# Patient Record
Sex: FEMALE | Race: WHITE | NOT HISPANIC OR LATINO | URBAN - METROPOLITAN AREA
[De-identification: names, ages, dates, MRNs, and addresses within clinical notes are randomized per-mention and may not be internally consistent; named-entity substitution may affect disease eponyms.]

---

## 2017-09-08 ENCOUNTER — EMERGENCY (EMERGENCY)
Facility: HOSPITAL | Age: 47
LOS: 0 days | Discharge: HOME | End: 2017-09-08

## 2017-09-08 DIAGNOSIS — J20.9 ACUTE BRONCHITIS, UNSPECIFIED: ICD-10-CM

## 2017-09-08 DIAGNOSIS — R05 COUGH: ICD-10-CM

## 2019-09-14 ENCOUNTER — EMERGENCY (EMERGENCY)
Facility: HOSPITAL | Age: 49
LOS: 0 days | Discharge: HOME | End: 2019-09-14
Admitting: EMERGENCY MEDICINE
Payer: COMMERCIAL

## 2019-09-14 VITALS
SYSTOLIC BLOOD PRESSURE: 133 MMHG | WEIGHT: 192.02 LBS | RESPIRATION RATE: 18 BRPM | OXYGEN SATURATION: 100 % | HEART RATE: 76 BPM | TEMPERATURE: 97 F | DIASTOLIC BLOOD PRESSURE: 86 MMHG
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
## 2019-09-14 DIAGNOSIS — M54.5 LOW BACK PAIN: ICD-10-CM
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
## 2019-09-14 DIAGNOSIS — M54.9 DORSALGIA, UNSPECIFIED: ICD-10-CM
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
## 2019-09-14 PROCEDURE — 99283 EMERGENCY DEPT VISIT LOW MDM: CPT

## 2019-09-14 RX ORDER — METHOCARBAMOL 500 MG/1
2 TABLET, FILM COATED ORAL
Qty: 30 | Refills: 0
Start: 2019-09-14 | End: 2019-09-18

## 2019-09-14 NOTE — ED PROVIDER NOTE - CARE PROVIDER_API CALL
NEUROSCIENCE WALK IN [place],   MON-FRI 8AM TO 6PM  [address] [place] [address]  Phone: [phone]  Fax: (   )    -  Follow Up Time:     Dustin Edwards)  Neurological Surgery  [address] [place] [address]  Phone: [phone]  Fax: [phone]  Follow Up Time:     YOUR PRIMARY CARE PHYSICIAN,   Phone: (   )    -  Fax: (   )    -  Follow Up Time:

## 2019-09-14 NOTE — ED ADULT NURSE NOTE - OBJECTIVE STATEMENT
pt presents with left lower back pain that started 2 days ago after leaning down to move a rug. denies pain radiating down left leg, abdominal pain. prior treatment: lidotral 3.88%, cyclobenzaprine 15 mg, naproxen 375 mg, and  pennsaid 2% with no symptom relief.

## 2019-09-14 NOTE — ED PROVIDER NOTE - MUSCULOSKELETAL, MLM
+ left lumbar paravertebral tenderness without spinous process tenderness ; Spine appears normal, range of motion is not limited, no joint tenderness

## 2019-09-14 NOTE — ED PROVIDER NOTE - PROVIDER TOKENS
FREE:[LAST:[NEUROSCIENCE WALK IN [place]],PHONE:[phone]],FAX:[(   )    -],ADDRESS:[MON-FRI 8AM TO 6PM  [address] [place] [address]]],PROVIDER:[TOKEN:[[id number]]],FREE:[LAST:[YOUR PRIMARY CARE PHYSICIAN],PHONE:[(   )    -],FAX:[(   )    -]]

## 2019-09-14 NOTE — ED PROVIDER NOTE - OBJECTIVE STATEMENT
49 y/o F, no significant PMHx, presents to the ED with complaints of left lower back pain x two days. Patient states that she was bending forward to move a heavy rug when she felt pain along her left lower back that is exacerbated upon movement. She denies chest pain, dyspnea, radiating pain, numbness/tingling, bowel/bladder incontinence, skin color changes/rash and abdominal pain. She states that her friend is a pharmacist and supplied her with Diclofenac lotion, lidocaine patches, Naprosyn 375 PO TID and Flexeril 15mg.

## 2019-09-17 PROBLEM — Z00.00 ENCOUNTER FOR PREVENTIVE HEALTH EXAMINATION: Status: ACTIVE | Noted: 2019-09-17

## 2019-10-15 ENCOUNTER — APPOINTMENT (OUTPATIENT)
Dept: PLASTIC SURGERY | Facility: CLINIC | Age: 49
End: 2019-10-15

## 2021-11-30 PROBLEM — Z78.9 OTHER SPECIFIED HEALTH STATUS: Chronic | Status: ACTIVE | Noted: 2019-09-14

## 2021-12-16 ENCOUNTER — APPOINTMENT (OUTPATIENT)
Dept: OTOLARYNGOLOGY | Facility: CLINIC | Age: 51
End: 2021-12-16
Payer: COMMERCIAL

## 2021-12-16 VITALS — BODY MASS INDEX: 28.79 KG/M2 | WEIGHT: 190 LBS | HEIGHT: 68 IN

## 2021-12-16 DIAGNOSIS — R06.83 SNORING: ICD-10-CM

## 2021-12-16 DIAGNOSIS — J34.89 OTHER SPECIFIED DISORDERS OF NOSE AND NASAL SINUSES: ICD-10-CM

## 2021-12-16 DIAGNOSIS — J32.9 CHRONIC SINUSITIS, UNSPECIFIED: ICD-10-CM

## 2021-12-16 DIAGNOSIS — G47.9 SLEEP DISORDER, UNSPECIFIED: ICD-10-CM

## 2021-12-16 PROCEDURE — 99204 OFFICE O/P NEW MOD 45 MIN: CPT | Mod: 25

## 2021-12-16 PROCEDURE — 31231 NASAL ENDOSCOPY DX: CPT

## 2021-12-16 RX ORDER — AMOXICILLIN AND CLAVULANATE POTASSIUM 875; 125 MG/1; MG/1
875-125 TABLET, COATED ORAL
Qty: 20 | Refills: 0 | Status: ACTIVE | COMMUNITY
Start: 2021-12-16 | End: 1900-01-01

## 2021-12-16 RX ORDER — FEXOFENADINE HCL AND PSEUDOEPHEDRINE HCI 60; 120 MG/1; MG/1
60-120 TABLET, EXTENDED RELEASE ORAL
Qty: 40 | Refills: 2 | Status: ACTIVE | COMMUNITY
Start: 2021-12-16 | End: 1900-01-01

## 2021-12-16 RX ORDER — METHYLPREDNISOLONE 4 MG/1
4 TABLET ORAL
Qty: 1 | Refills: 0 | Status: ACTIVE | COMMUNITY
Start: 2021-12-16 | End: 1900-01-01

## 2021-12-16 NOTE — HISTORY OF PRESENT ILLNESS
[de-identified] : Patient presents  today c/o Sinus headaches and snoring  . Frontal  headaches  around headaches and eyes .  Seasonal allergies ,   was taking  singular and loratadine.  Claritin  as needed. pt has nasal congestion and has been on allergy meds and nasal sprays  with minimal improvement.   \par No dizziness or  pressure in ears \par c/o snoring. present for several months. wakes up gasping for air, restless sleep, morning headaches, tiredness in daytime, intermittent sleep, gained weight recently feels problem has worsened in the past month [Snoring] : snoring [Witnessed Apneas] : witnessed sleep apnea [Frequent Nocturnal Awakening] : frequent nocturnal awakening [Daytime Somnolence] : daytime somnolence [Awakes Unrefreshed] : awakening unrefreshed [Awakes with Headache] : headache upon awakening [Awakening With Dry Mouth] : awakening with dry mouth

## 2021-12-16 NOTE — PROCEDURE
[Flexible Endoscope] : examined with the flexible endoscope [Congested] : congested [Shahnaz] : shahnaz [Nasal Mucosa] : purulence on the right [Normal] : the paranasal sinuses had no abnormalities

## 2021-12-16 NOTE — PHYSICAL EXAM
[Nasal Endoscopy Performed] : nasal endoscopy was performed, see procedure section for findings [Midline] : trachea located in midline position [Normal] : no rashes [de-identified] : DU

## 2022-01-20 ENCOUNTER — APPOINTMENT (OUTPATIENT)
Dept: OTOLARYNGOLOGY | Facility: CLINIC | Age: 52
End: 2022-01-20

## 2022-02-28 ENCOUNTER — INPATIENT (INPATIENT)
Facility: HOSPITAL | Age: 52
LOS: 0 days | Discharge: HOME | End: 2022-03-01
Attending: INTERNAL MEDICINE | Admitting: INTERNAL MEDICINE
Payer: COMMERCIAL

## 2022-02-28 VITALS
HEART RATE: 91 BPM | OXYGEN SATURATION: 98 % | DIASTOLIC BLOOD PRESSURE: 69 MMHG | SYSTOLIC BLOOD PRESSURE: 130 MMHG | RESPIRATION RATE: 20 BRPM | TEMPERATURE: 97 F

## 2022-02-28 LAB
ALBUMIN SERPL ELPH-MCNC: 4.3 G/DL — SIGNIFICANT CHANGE UP (ref 3.5–5.2)
ALP SERPL-CCNC: 65 U/L — SIGNIFICANT CHANGE UP (ref 30–115)
ALT FLD-CCNC: 80 U/L — HIGH (ref 0–41)
ANION GAP SERPL CALC-SCNC: 11 MMOL/L — SIGNIFICANT CHANGE UP (ref 7–14)
APTT BLD: 33.1 SEC — SIGNIFICANT CHANGE UP (ref 27–39.2)
AST SERPL-CCNC: 42 U/L — HIGH (ref 0–41)
BASOPHILS # BLD AUTO: 0.05 K/UL — SIGNIFICANT CHANGE UP (ref 0–0.2)
BASOPHILS NFR BLD AUTO: 0.5 % — SIGNIFICANT CHANGE UP (ref 0–1)
BILIRUB SERPL-MCNC: 0.3 MG/DL — SIGNIFICANT CHANGE UP (ref 0.2–1.2)
BUN SERPL-MCNC: 12 MG/DL — SIGNIFICANT CHANGE UP (ref 10–20)
CALCIUM SERPL-MCNC: 9.8 MG/DL — SIGNIFICANT CHANGE UP (ref 8.5–10.1)
CHLORIDE SERPL-SCNC: 107 MMOL/L — SIGNIFICANT CHANGE UP (ref 98–110)
CO2 SERPL-SCNC: 22 MMOL/L — SIGNIFICANT CHANGE UP (ref 17–32)
CREAT SERPL-MCNC: 0.6 MG/DL — LOW (ref 0.7–1.5)
EGFR: 107 ML/MIN/1.73M2 — SIGNIFICANT CHANGE UP
EOSINOPHIL # BLD AUTO: 0.2 K/UL — SIGNIFICANT CHANGE UP (ref 0–0.7)
EOSINOPHIL NFR BLD AUTO: 2.1 % — SIGNIFICANT CHANGE UP (ref 0–8)
GLUCOSE SERPL-MCNC: 87 MG/DL — SIGNIFICANT CHANGE UP (ref 70–99)
HCG SERPL QL: NEGATIVE — SIGNIFICANT CHANGE UP
HCT VFR BLD CALC: 39.7 % — SIGNIFICANT CHANGE UP (ref 37–47)
HGB BLD-MCNC: 13 G/DL — SIGNIFICANT CHANGE UP (ref 12–16)
IMM GRANULOCYTES NFR BLD AUTO: 0.2 % — SIGNIFICANT CHANGE UP (ref 0.1–0.3)
INR BLD: 1.05 RATIO — SIGNIFICANT CHANGE UP (ref 0.65–1.3)
LACTATE SERPL-SCNC: 0.8 MMOL/L — SIGNIFICANT CHANGE UP (ref 0.7–2)
LIDOCAIN IGE QN: 32 U/L — SIGNIFICANT CHANGE UP (ref 7–60)
LYMPHOCYTES # BLD AUTO: 3.68 K/UL — HIGH (ref 1.2–3.4)
LYMPHOCYTES # BLD AUTO: 38.1 % — SIGNIFICANT CHANGE UP (ref 20.5–51.1)
MCHC RBC-ENTMCNC: 28.8 PG — SIGNIFICANT CHANGE UP (ref 27–31)
MCHC RBC-ENTMCNC: 32.7 G/DL — SIGNIFICANT CHANGE UP (ref 32–37)
MCV RBC AUTO: 88 FL — SIGNIFICANT CHANGE UP (ref 81–99)
MONOCYTES # BLD AUTO: 0.62 K/UL — HIGH (ref 0.1–0.6)
MONOCYTES NFR BLD AUTO: 6.4 % — SIGNIFICANT CHANGE UP (ref 1.7–9.3)
NEUTROPHILS # BLD AUTO: 5.08 K/UL — SIGNIFICANT CHANGE UP (ref 1.4–6.5)
NEUTROPHILS NFR BLD AUTO: 52.7 % — SIGNIFICANT CHANGE UP (ref 42.2–75.2)
NRBC # BLD: 0 /100 WBCS — SIGNIFICANT CHANGE UP (ref 0–0)
PLATELET # BLD AUTO: 288 K/UL — SIGNIFICANT CHANGE UP (ref 130–400)
POTASSIUM SERPL-MCNC: 4.1 MMOL/L — SIGNIFICANT CHANGE UP (ref 3.5–5)
POTASSIUM SERPL-SCNC: 4.1 MMOL/L — SIGNIFICANT CHANGE UP (ref 3.5–5)
PROT SERPL-MCNC: 7.5 G/DL — SIGNIFICANT CHANGE UP (ref 6–8)
PROTHROM AB SERPL-ACNC: 12.1 SEC — SIGNIFICANT CHANGE UP (ref 9.95–12.87)
RBC # BLD: 4.51 M/UL — SIGNIFICANT CHANGE UP (ref 4.2–5.4)
RBC # FLD: 12.9 % — SIGNIFICANT CHANGE UP (ref 11.5–14.5)
SARS-COV-2 RNA SPEC QL NAA+PROBE: SIGNIFICANT CHANGE UP
SODIUM SERPL-SCNC: 140 MMOL/L — SIGNIFICANT CHANGE UP (ref 135–146)
WBC # BLD: 9.65 K/UL — SIGNIFICANT CHANGE UP (ref 4.8–10.8)
WBC # FLD AUTO: 9.65 K/UL — SIGNIFICANT CHANGE UP (ref 4.8–10.8)

## 2022-02-28 PROCEDURE — 74177 CT ABD & PELVIS W/CONTRAST: CPT | Mod: 26,MA

## 2022-02-28 PROCEDURE — 99285 EMERGENCY DEPT VISIT HI MDM: CPT

## 2022-02-28 PROCEDURE — 74019 RADEX ABDOMEN 2 VIEWS: CPT | Mod: 26

## 2022-02-28 PROCEDURE — 99223 1ST HOSP IP/OBS HIGH 75: CPT

## 2022-02-28 PROCEDURE — 99283 EMERGENCY DEPT VISIT LOW MDM: CPT

## 2022-02-28 RX ORDER — ONDANSETRON 8 MG/1
4 TABLET, FILM COATED ORAL EVERY 8 HOURS
Refills: 0 | Status: DISCONTINUED | OUTPATIENT
Start: 2022-02-28 | End: 2022-03-01

## 2022-02-28 RX ORDER — SOD SULF/SODIUM/NAHCO3/KCL/PEG
4000 SOLUTION, RECONSTITUTED, ORAL ORAL ONCE
Refills: 0 | Status: COMPLETED | OUTPATIENT
Start: 2022-02-28 | End: 2022-02-28

## 2022-02-28 RX ORDER — LANOLIN ALCOHOL/MO/W.PET/CERES
3 CREAM (GRAM) TOPICAL AT BEDTIME
Refills: 0 | Status: DISCONTINUED | OUTPATIENT
Start: 2022-02-28 | End: 2022-03-01

## 2022-02-28 RX ORDER — ACETAMINOPHEN 500 MG
650 TABLET ORAL EVERY 6 HOURS
Refills: 0 | Status: DISCONTINUED | OUTPATIENT
Start: 2022-02-28 | End: 2022-03-01

## 2022-02-28 RX ORDER — ENOXAPARIN SODIUM 100 MG/ML
40 INJECTION SUBCUTANEOUS AT BEDTIME
Refills: 0 | Status: DISCONTINUED | OUTPATIENT
Start: 2022-02-28 | End: 2022-03-01

## 2022-02-28 RX ADMIN — Medication 4000 MILLILITER(S): at 22:36

## 2022-02-28 RX ADMIN — Medication 20 MILLIGRAM(S): at 22:36

## 2022-02-28 NOTE — ED PROVIDER NOTE - OBJECTIVE STATEMENT
50 yo female with no pertinent c/o FB ingestion. pt states to have swallowed her dental bridge around 5pm yesterday. pt states to have had nausea yesterday that has subsided. pt denies any other symptoms including fevers, chill, headache, recent illness/travel, cough, abdominal pain, chest pain, or SOB.

## 2022-02-28 NOTE — H&P ADULT - NSHPLABSRESULTS_GEN_ALL_CORE
13.0   9.65  )-----------( 288      ( 28 Feb 2022 15:30 )             39.7       02-28    140  |  107  |  12  ----------------------------<  87  4.1   |  22  |  0.6<L>    Ca    9.8      28 Feb 2022 15:30    TPro  7.5  /  Alb  4.3  /  TBili  0.3  /  DBili  x   /  AST  42<H>  /  ALT  80<H>  /  AlkPhos  65  02-28                  PT/INR - ( 28 Feb 2022 15:30 )   PT: 12.10 sec;   INR: 1.05 ratio         PTT - ( 28 Feb 2022 15:30 )  PTT:33.1 sec    Lactate Trend  02-28 @ 15:30 Lactate:0.8             CAPILLARY BLOOD GLUCOSE

## 2022-02-28 NOTE — CONSULT NOTE ADULT - SUBJECTIVE AND OBJECTIVE BOX
GENERAL SURGERY CONSULT NOTE    Patient: MONTSE WILLSON , 51y ([birth year])Female   MRN: [id number]  Location: [facility] ED  Visit: 02-28-22 Emergency  Date: 02-28-22 @ 19:16    HPI: 52 y/o w/ no PMH presents after ingestion of dentures. Patient was at dinner yesterday and accidently swallowed her dentures. No fever, nausea, vomiting. Passing gas and bowel movements. Asymptomatic       PAST MEDICAL & SURGICAL HISTORY:  No pertinent past medical history        Home Medications:        VITALS:  T(F): 97.4 (02-28-22 @ 12:48), Max: 97.4 (02-28-22 @ 12:48)  HR: 91 (02-28-22 @ 12:48) (91 - 91)  BP: 130/69 (02-28-22 @ 12:48) (130/69 - 130/69)  RR: 20 (02-28-22 @ 12:48) (20 - 20)  SpO2: 98% (02-28-22 @ 12:48) (98% - 98%)    PHYSICAL EXAM:  General: NAD, AAOx3, calm and cooperative  HEENT: NCAT, CLAUDIA, EOMI, Trachea ML, Neck supple  Cardiac: RRR S1, S2, no Murmurs, rubs or gallops  Respiratory: CTAB, normal respiratory effort, breath sounds equal BL  Abdomen: Soft, non-distended, non-tender, no rebound, no guarding. +BS.  Musculoskeletal: Strength equal BL UE/LE, ROM intact  Skin: Warm/dry, normal color, no jaundice    MEDICATIONS  (STANDING):    MEDICATIONS  (PRN):      LAB/STUDIES:                        13.0   9.65  )-----------( 288      ( 28 Feb 2022 15:30 )             39.7     02-28    140  |  107  |  12  ----------------------------<  87  4.1   |  22  |  0.6<L>    Ca    9.8      28 Feb 2022 15:30    TPro  7.5  /  Alb  4.3  /  TBili  0.3  /  DBili  x   /  AST  42<H>  /  ALT  80<H>  /  AlkPhos  65  02-28    PT/INR - ( 28 Feb 2022 15:30 )   PT: 12.10 sec;   INR: 1.05 ratio         PTT - ( 28 Feb 2022 15:30 )  PTT:33.1 sec  LIVER FUNCTIONS - ( 28 Feb 2022 15:30 )  Alb: 4.3 g/dL / Pro: 7.5 g/dL / ALK PHOS: 65 U/L / ALT: 80 U/L / AST: 42 U/L / GGT: x                 IMAGING:  < from: CT Abdomen and Pelvis w/ IV Cont (02.28.22 @ 17:01) >  IMPRESSION:    Radiopaque 3.1 cm foreign body in the cecum, consistent with dental   implant    Hepatic steatosis.    < end of copied text >  < from: Xray Abdomen 2 Views (02.28.22 @ 14:56) >  impression:    There is a right lower quadrant foreign body measuring approximately 3.7   cm. This may be within the cecum, but exact location is difficult to   evaluate on this examination.    An IUD is present.    The bowel gas pattern is not obstructed.    The osseous structures are unremarkable for age.    < end of copied text >

## 2022-02-28 NOTE — ED PROVIDER NOTE - ATTENDING CONTRIBUTION TO CARE
52 yo F pw swallowed foreign body. Pt states last night while eating she swallowed her dental bridge. Associated with mild nausea. No abd pain, no vomiting, no diarrhea, no cp, no sob, no f/c.     CONSTITUTIONAL: Well-developed; well-nourished; in no acute distress. Sitting up and providing appropriate history and physical examination  SKIN: skin exam is warm and dry, no acute rash.  HEAD: Normocephalic; atraumatic.  EYES: PERRL, 3 mm bilateral, no nystagmus, EOM intact; conjunctiva and sclera clear.  ENT: No nasal discharge; airway clear.  NECK: Supple; non tender. + full passive ROM in all directions. No JVD  CARD: S1, S2 normal; no murmurs, gallops, or rubs. Regular rate and rhythm. + Symmetric Strong Pulses  RESP: No wheezes, rales or rhonchi. Good air movement bilaterally  ABD: soft; non-distended; non-tender. No Rebound, No Guarding, No signs of peritonitis, No CVA tenderness. No pulsatile abdominal mass. + Strong and Symmetric Pulses  EXT: Normal ROM. No clubbing, cyanosis or edema. Dp and Pt Pulses intact. Cap refill less than 3 seconds  NEURO: CN 2-12 intact, normal finger to nose, normal romberg, stable gait, no sensory or motor deficits, Alert, oriented, grossly unremarkable. No Focal deficits. GCS 15. NIH 0  PSYCH: Cooperative, appropriate.

## 2022-02-28 NOTE — CONSULT NOTE ADULT - ATTENDING COMMENTS
Ingestion of dentures, Xray does not show any movement of dentures. To attempt Colonoscopy and removal.
Patient is a 51F with no PMH who presents after accidentally swallowing her dentures.    Patient seen and examined.  No acute events.  Patient denies any active symptoms    Abdomen - soft, non tender, non distended    Vitals labs and images reviewed    Plan  - no acute surgical intervention  - allow time for patient to pass the denture  - daily KUB  - f/u GI for possible colonoscopy and retrieval  - surgery to follow

## 2022-02-28 NOTE — H&P ADULT - ASSESSMENT
52 yo female with no significant PMH presents to the ED c/o accidental ingestion of her dental bridge around 5pm yesterday. Pt endorsed nausea yesterday that has subsided. Denies fevers, chill, headache, recent illness/travel, cough, abdominal pain, chest pain, or SOB.    In the ED, pt hemodynamically stable. Labs wnl except for AST 42, ALT 80      # Foreign body in colon (Dental Bridge)  - swallowed 2/27 around 5pm  - KUB suggestive of foreign body in cecum  - pt denies abdominal pain  - CT abdomen/pelvis with IV contrast - Radiopaque 3.1 cm foreign body in the cecum, consistent with dental   implant. Hepatic steatosis.  - f/u rpt KUB AM  - Surgery eval - No acute surgical intervention   - Clear liquid diet  - golytely 4L start now  - dulcolax 20 mg at 10 pm  - NPO after midnight  -possible colonoscopy in AM if objects dose not pass    #Mild transaminitis   Hepatic steatosis on CT abdomen  Trend LFTs qD     #DVT PPX : Lovenox  #Diet: NPO after MN  #GI PPX: Not indicated  #Activity: IAT  FULL CODE

## 2022-02-28 NOTE — ED PROVIDER NOTE - CLINICAL SUMMARY MEDICAL DECISION MAKING FREE TEXT BOX
51-year-old female here for swallowing of foreign body.  In the emergency department had screening exam, imaging, consultation with gastroenterology, with plan for observation pending forward motion potential transit.  No indication for acute procedural intervention at this time.  Imaging consistent with clinical impression.  Will admit for continued management and observation of progression of ingested sizable foreign body.

## 2022-02-28 NOTE — CONSULT NOTE ADULT - ASSESSMENT
ASSESSMENT:  51yF w/ no PMH presents after swallowing her denture. Asymptomatic. Imaging shows object passed into cecum    PLAN:  - Admit to Medicine  - No need for surgical intervention at this time  - Patient is passing gas and having bowel movements, possible to pass object on her own  - GI on board for C-scope if needed    Above plan discussed with Attending Surgeon Dr. Kessler  02-28-22 @ 19:16  
52 y/o F with h/o sinusitis came to ER because she swallowed her dental bridge yesterday around 5 pm while she was eating salad. She denies any abdominal pain, melena or hematochezia.        # Foreign body in colon:  - swallowed dental bridge yesterday 5pm  - KUB suggestive of foreign body in cecum  - No abdominal pain  - Abdomen soft    Rec:  - Please send CBC, BMP, PT/INR and active type and screen  - COVID PCR  - CT abdomen/pelvis with IV contrast  - KUB AM  - Surgery eval  - Clear liquid diet  - golytely 4L start now  - dulcolax 20 mg at 10 pm  - NPO after midnight  - will pan for colonoscopy tomorrow if objects dose not pass

## 2022-02-28 NOTE — ED PROVIDER NOTE - NSTIMEPROVIDERCAREINITIATE_GEN_ER
Follow up in 2 weeks  Utilize counseling at school as needed  Suicide prevention resources as needed 
28-Feb-2022 13:06

## 2022-02-28 NOTE — ED PROVIDER NOTE - ADMIT DISPOSITION PRESENT ON ADMISSION SEPSIS
Quality 226: Preventive Care And Screening: Tobacco Use: Screening And Cessation Intervention: Patient screened for tobacco use and is an ex/non-smoker Quality 431: Preventive Care And Screening: Unhealthy Alcohol Use - Screening: Patient screened for unhealthy alcohol use using a single question and scores less than 2 times per year Quality 110: Preventive Care And Screening: Influenza Immunization: Influenza Immunization previously received during influenza season Quality 265: Biopsy Follow-Up: Biopsy results reviewed, communicated, tracked, and documented Detail Level: Generalized Quality 130: Documentation Of Current Medications In The Medical Record: Current Medications Documented No

## 2022-02-28 NOTE — H&P ADULT - HISTORY OF PRESENT ILLNESS
52 yo female with no significant PMH presents to the ED c/o accidental ingestion of her dental bridge around 5pm yesterday. Pt endorsed nausea yesterday that has subsided. Denies fevers, chill, headache, recent illness/travel, cough, abdominal pain, chest pain, or SOB.    In the ED, pt hemodynamically stable. Labs wnl except for AST 42, ALT 80    CT abdomen - CT abdomen/pelvis with IV contrast - Radiopaque 3.1 cm foreign body in the cecum, consistent with dental implant. Hepatic steatosis.    Evaluated by GI and Surgical team- No acute surgical intervention. Possible colonoscopy in AM if pt does not pass object.

## 2022-02-28 NOTE — ED PROVIDER NOTE - PROGRESS NOTE DETAILS
GI consulted and recommend CT/labs/colorectal consult surgery consulted Pt signed out to Dr. Del Cid pending CT, reassessment, dispo.

## 2022-02-28 NOTE — CONSULT NOTE ADULT - SUBJECTIVE AND OBJECTIVE BOX
Gastroenterology Consultation:    Patient is a 51y old  Female who presents with a chief complaint of foreign body    Admitted on: 02-28-22  HPI:  52 y/o F with h/o sinusitis came to ER because she swallowed her dental bridge yesterday around 5 pm while she was eating salad. She denies any abdominal pain, melena or hematochezia.      Prior records Reviewed (Y/N): Y  History obtained from person other than patient (Y/N): N    Prior EGD: Never  Prior Colonoscopy: never      PAST MEDICAL & SURGICAL HISTORY:  No pertinent past medical history        FAMILY HISTORY:  Non-contributry    Social History:  Tobacco: N  Alcohol: N  Drugs: N    Home Medications:    MEDICATIONS  (STANDING):    MEDICATIONS  (PRN):      Allergies  No Known Allergies      Review of Systems:   Constitutional:  No Fever, No Chills  ENT/Mouth:  No Hearing Changes,  No Difficulty Swallowing  Eyes:  No Eye Pain, No Vision Changes  Cardiovascular:  No Chest Pain, No Palpitations  Respiratory:  No Cough, No Dyspnea  Gastrointestinal:  As described in HPI  Musculoskeletal:  No Joint Swelling, No Back Pain  Skin:  No Skin Lesions, No Jaundice  Neuro:  No Syncope, No Dizziness  Heme/Lymph:  No Bruising, No Bleeding.          Physical Examination:  T(C): 36.3 (02-28-22 @ 12:48), Max: 36.3 (02-28-22 @ 12:48)  HR: 91 (02-28-22 @ 12:48) (91 - 91)  BP: 130/69 (02-28-22 @ 12:48) (130/69 - 130/69)  RR: 20 (02-28-22 @ 12:48) (20 - 20)  SpO2: 98% (02-28-22 @ 12:48) (98% - 98%)        Constitutional: No acute distress.  Eyes:. Conjunctivae are clear, Sclera is non-icteric.  Ears Nose and Throat: The external ears are normal appearing,  Oral mucosa is pink and moist.  Respiratory:  No signs of respiratory distress. Lung sounds are clear bilaterally.  Cardiovascular:  S1 S2, Regular rate and rhythm.  GI: Abdomen is soft, symmetric, and non-tender without distention. There are no visible lesions. Bowel sounds are present and normoactive in all four quadrants. No masses, hepatomegaly, or splenomegaly are noted.   Neuro: No Tremor, No involuntary movements  Skin: No rashes, No Jaundice.          Data: (reviewed by attending)    Hgb Trend:            Liver panel trend:

## 2022-03-01 ENCOUNTER — TRANSCRIPTION ENCOUNTER (OUTPATIENT)
Age: 52
End: 2022-03-01

## 2022-03-01 VITALS
HEART RATE: 66 BPM | RESPIRATION RATE: 18 BRPM | SYSTOLIC BLOOD PRESSURE: 144 MMHG | TEMPERATURE: 97 F | DIASTOLIC BLOOD PRESSURE: 87 MMHG | OXYGEN SATURATION: 98 %

## 2022-03-01 LAB
ABO RH CONFIRMATION: SIGNIFICANT CHANGE UP
ALBUMIN SERPL ELPH-MCNC: 4.6 G/DL — SIGNIFICANT CHANGE UP (ref 3.5–5.2)
ALP SERPL-CCNC: 68 U/L — SIGNIFICANT CHANGE UP (ref 30–115)
ALT FLD-CCNC: 93 U/L — HIGH (ref 0–41)
ANION GAP SERPL CALC-SCNC: 12 MMOL/L — SIGNIFICANT CHANGE UP (ref 7–14)
APTT BLD: 34.7 SEC — SIGNIFICANT CHANGE UP (ref 27–39.2)
AST SERPL-CCNC: 49 U/L — HIGH (ref 0–41)
BILIRUB SERPL-MCNC: 0.5 MG/DL — SIGNIFICANT CHANGE UP (ref 0.2–1.2)
BUN SERPL-MCNC: 10 MG/DL — SIGNIFICANT CHANGE UP (ref 10–20)
CALCIUM SERPL-MCNC: 10 MG/DL — SIGNIFICANT CHANGE UP (ref 8.5–10.1)
CHLORIDE SERPL-SCNC: 108 MMOL/L — SIGNIFICANT CHANGE UP (ref 98–110)
CO2 SERPL-SCNC: 26 MMOL/L — SIGNIFICANT CHANGE UP (ref 17–32)
CREAT SERPL-MCNC: 0.7 MG/DL — SIGNIFICANT CHANGE UP (ref 0.7–1.5)
EGFR: 103 ML/MIN/1.73M2 — SIGNIFICANT CHANGE UP
GLUCOSE SERPL-MCNC: 86 MG/DL — SIGNIFICANT CHANGE UP (ref 70–99)
HCT VFR BLD CALC: 42.7 % — SIGNIFICANT CHANGE UP (ref 37–47)
HGB BLD-MCNC: 13.7 G/DL — SIGNIFICANT CHANGE UP (ref 12–16)
INR BLD: 1.07 RATIO — SIGNIFICANT CHANGE UP (ref 0.65–1.3)
MCHC RBC-ENTMCNC: 28.7 PG — SIGNIFICANT CHANGE UP (ref 27–31)
MCHC RBC-ENTMCNC: 32.1 G/DL — SIGNIFICANT CHANGE UP (ref 32–37)
MCV RBC AUTO: 89.5 FL — SIGNIFICANT CHANGE UP (ref 81–99)
NRBC # BLD: 0 /100 WBCS — SIGNIFICANT CHANGE UP (ref 0–0)
PLATELET # BLD AUTO: 288 K/UL — SIGNIFICANT CHANGE UP (ref 130–400)
POTASSIUM SERPL-MCNC: 4.4 MMOL/L — SIGNIFICANT CHANGE UP (ref 3.5–5)
POTASSIUM SERPL-SCNC: 4.4 MMOL/L — SIGNIFICANT CHANGE UP (ref 3.5–5)
PROT SERPL-MCNC: 8.2 G/DL — HIGH (ref 6–8)
PROTHROM AB SERPL-ACNC: 12.3 SEC — SIGNIFICANT CHANGE UP (ref 9.95–12.87)
RBC # BLD: 4.77 M/UL — SIGNIFICANT CHANGE UP (ref 4.2–5.4)
RBC # FLD: 12.8 % — SIGNIFICANT CHANGE UP (ref 11.5–14.5)
SODIUM SERPL-SCNC: 146 MMOL/L — SIGNIFICANT CHANGE UP (ref 135–146)
WBC # BLD: 8.96 K/UL — SIGNIFICANT CHANGE UP (ref 4.8–10.8)
WBC # FLD AUTO: 8.96 K/UL — SIGNIFICANT CHANGE UP (ref 4.8–10.8)

## 2022-03-01 PROCEDURE — 45379 COLONOSCOPY W/FB REMOVAL: CPT

## 2022-03-01 PROCEDURE — 99232 SBSQ HOSP IP/OBS MODERATE 35: CPT

## 2022-03-01 PROCEDURE — 74018 RADEX ABDOMEN 1 VIEW: CPT | Mod: 26

## 2022-03-01 PROCEDURE — 99238 HOSP IP/OBS DSCHRG MGMT 30/<: CPT | Mod: 25

## 2022-03-01 RX ORDER — SODIUM CHLORIDE 9 MG/ML
1000 INJECTION, SOLUTION INTRAVENOUS
Refills: 0 | Status: DISCONTINUED | OUTPATIENT
Start: 2022-03-01 | End: 2022-03-01

## 2022-03-01 RX ORDER — MULTIVIT WITH MIN/MFOLATE/K2 340-15/3 G
1 POWDER (GRAM) ORAL ONCE
Refills: 0 | Status: COMPLETED | OUTPATIENT
Start: 2022-03-01 | End: 2022-03-01

## 2022-03-01 RX ADMIN — Medication 1 BOTTLE: at 07:39

## 2022-03-01 NOTE — DISCHARGE NOTE PROVIDER - NSDCFUADDAPPT_GEN_ALL_CORE_FT
Please follow up with your primary care doctor after discharge from the hospital. Please take your medications as prescribed.

## 2022-03-01 NOTE — PRE-ANESTHESIA EVALUATION ADULT - NSANTHOSAYNRD_GEN_A_CORE
No. JHONNY screening performed.  STOP BANG Legend: 0-2 = LOW Risk; 3-4 = INTERMEDIATE Risk; 5-8 = HIGH Risk

## 2022-03-01 NOTE — PROGRESS NOTE ADULT - SUBJECTIVE AND OBJECTIVE BOX
Colorectal SURGERY PROGRESS NOTE    Patient: MONTSE WILLSON , 51y ([birth year])Female   MRN: [id number]  Location: [facility] A  Visit: 02-28-22 Inpatient  Date: 03-01-22 @ 01:16    Hospital Day #: 2    Procedure/Dx/Injuries: Ingestion of foreign body    Events of past 24 hours: No acute events    PAST MEDICAL & SURGICAL HISTORY:  No pertinent past medical history        Vitals:   T(F): 97.4 (02-28-22 @ 12:48), Max: 97.4 (02-28-22 @ 12:48)  HR: 91 (02-28-22 @ 12:48)  BP: 130/69 (02-28-22 @ 12:48)  RR: 20 (02-28-22 @ 12:48)  SpO2: 98% (02-28-22 @ 12:48)      Diet, NPO after Midnight:      NPO Start Date: 28-Feb-2022,   NPO Start Time: 23:59  Diet, Clear Liquid    PHYSICAL EXAM:  General: NAD, AAOx3, calm and cooperative  HEENT: NCAT, CLAUDIA, EOMI, Trachea ML, Neck supple  Cardiac: RRR S1, S2, no Murmurs, rubs or gallops  Respiratory: CTAB, normal respiratory effort, breath sounds equal BL  Abdomen: Soft, non-distended, non-tender, no rebound, no guarding. +BS.  Musculoskeletal: Strength equal BL UE/LE, ROM intact  Skin: Warm/dry, normal color, no jaundice      MEDICATIONS  (STANDING):  enoxaparin Injectable 40 milliGRAM(s) SubCutaneous at bedtime    MEDICATIONS  (PRN):  acetaminophen     Tablet .. 650 milliGRAM(s) Oral every 6 hours PRN Temp greater or equal to 38C (100.4F), Mild Pain (1 - 3)  aluminum hydroxide/magnesium hydroxide/simethicone Suspension 30 milliLiter(s) Oral every 4 hours PRN Dyspepsia  melatonin 3 milliGRAM(s) Oral at bedtime PRN Insomnia  ondansetron Injectable 4 milliGRAM(s) IV Push every 8 hours PRN Nausea and/or Vomiting      DVT PROPHYLAXIS: enoxaparin Injectable 40 milliGRAM(s) SubCutaneous at bedtime    GI PROPHYLAXIS:   ANTICOAGULATION:   ANTIBIOTICS:      LAB/STUDIES:  Labs:  CAPILLARY BLOOD GLUCOSE                              13.0   9.65  )-----------( 288      ( 28 Feb 2022 15:30 )             39.7       Auto Neutrophil %: 52.7 % (02-28-22 @ 15:30)  Auto Immature Granulocyte %: 0.2 % (02-28-22 @ 15:30)    02-28    140  |  107  |  12  ----------------------------<  87  4.1   |  22  |  0.6<L>      Calcium, Total Serum: 9.8 mg/dL (02-28-22 @ 15:30)      LFTs:             7.5  | 0.3  | 42       ------------------[65      ( 28 Feb 2022 15:30 )  4.3  | x    | 80          Lipase:32     Amylase:x         Lactate, Blood: 0.8 mmol/L (02-28-22 @ 15:30)      Coags:     12.10  ----< 1.05    ( 28 Feb 2022 15:30 )     33.1        IMAGING:    < from: CT Abdomen and Pelvis w/ IV Cont (02.28.22 @ 17:01) >  IMPRESSION:    Radiopaque 3.1 cm foreign body in the cecum, consistent with dental   implant    Hepatic steatosis.

## 2022-03-01 NOTE — PROGRESS NOTE ADULT - ASSESSMENT
ASSESSMENT:  51yF w/ no PMH presents after swallowing her denture. Asymptomatic. Imaging shows object passed into cecum    PLAN:  - Continue care per primary team  - No need for surgical intervention at this time  - Patient is passing gas and having bowel movements, possible to pass object on her own  - GI on board for C-scope if needed  - Surgery will follow up    Blue team spectra [id number]  
ASSESSMENT:  51y F w/ no PMH presents w/ ingestion of foreign object. Did not pass in stool yet    PLAN:  - no acute surgical intervention  - allow time for patient to pass the denture  - daily KUB  - f/u GI for possible colonoscopy and retrieval  - surgery to follow .    Lines/Tubes: PIV, Midline, Central Line, A-Line, Chest tubes, Liam/ROSHAN drains, Claros Catheter.

## 2022-03-01 NOTE — DISCHARGE NOTE PROVIDER - NSDCCPCAREPLAN_GEN_ALL_CORE_FT
PRINCIPAL DISCHARGE DIAGNOSIS  Diagnosis: FB GI (foreign body in gastrointestinal tract)  Assessment and Plan of Treatment: You were admitted to the hospital following accidental ingestion of your dental bridge. CT scan showed radiopaque 3.1 cm foreign body in the cecum, consistent with dental implant. You were seen by our gastroenterology team and underwent colonoscopy. Your dental bridge was retrieved successfully. Please follow up with your primary care doctor after discharge from the hospital. Please take your medications as prescribed.

## 2022-03-01 NOTE — PROGRESS NOTE ADULT - SUBJECTIVE AND OBJECTIVE BOX
GENERAL SURGERY PROGRESS NOTE    Patient: MONTSE WILLSON , 51y ([birth year])Female   MRN: [id number]  Location: [facility] 017 A  Visit: 02-28-22 Inpatient  Date: 03-01-22 @ 12:57    Procedure/Dx/Injuries: ingestion of foreign body    Events of past 24 hours: No acute events. Did not pass ingested object    PAST MEDICAL & SURGICAL HISTORY:  No pertinent past medical history        Vitals:   T(F): 97.3 (03-01-22 @ 11:50), Max: 97.3 (03-01-22 @ 08:27)  HR: 84 (03-01-22 @ 12:21)  BP: 152/98 (03-01-22 @ 12:21)  RR: 18 (03-01-22 @ 12:21)  SpO2: 98% (03-01-22 @ 12:21)      Diet, NPO after Midnight:      NPO Start Date: 28-Feb-2022,   NPO Start Time: 23:59  Diet, Clear Liquid      Fluids:     I & O's:    Bowel Movement: : [x] YES [] NO  Flatus: : [x] YES [] NO    PHYSICAL EXAM:  General: NAD, AAOx3, calm and cooperative  HEENT: NCAT, CLAUDIA, EOMI, Trachea ML, Neck supple  Cardiac: RRR S1, S2, no Murmurs, rubs or gallops  Respiratory: CTAB, normal respiratory effort, breath sounds equal BL  Abdomen: Soft, non-distended, non-tender, no rebound, no guarding. +BS.  Musculoskeletal: Strength equal BL UE/LE, ROM intact  Skin: Warm/dry, normal color, no jaundice    MEDICATIONS  (STANDING):  enoxaparin Injectable 40 milliGRAM(s) SubCutaneous at bedtime    MEDICATIONS  (PRN):  acetaminophen     Tablet .. 650 milliGRAM(s) Oral every 6 hours PRN Temp greater or equal to 38C (100.4F), Mild Pain (1 - 3)  aluminum hydroxide/magnesium hydroxide/simethicone Suspension 30 milliLiter(s) Oral every 4 hours PRN Dyspepsia  melatonin 3 milliGRAM(s) Oral at bedtime PRN Insomnia  ondansetron Injectable 4 milliGRAM(s) IV Push every 8 hours PRN Nausea and/or Vomiting      DVT PROPHYLAXIS: enoxaparin Injectable 40 milliGRAM(s) SubCutaneous at bedtime    GI PROPHYLAXIS:   ANTICOAGULATION:   ANTIBIOTICS:            LAB/STUDIES:  Labs:  CAPILLARY BLOOD GLUCOSE                              13.7   8.96  )-----------( 288      ( 28 Feb 2022 23:30 )             42.7       Auto Neutrophil %: 52.7 % (02-28-22 @ 15:30)  Auto Immature Granulocyte %: 0.2 % (02-28-22 @ 15:30)    02-28    146  |  108  |  10  ----------------------------<  86  4.4   |  26  |  0.7      Calcium, Total Serum: 10.0 mg/dL (02-28-22 @ 23:30)      LFTs:             8.2  | 0.5  | 49       ------------------[68      ( 28 Feb 2022 23:30 )  4.6  | x    | 93          Lipase:x      Amylase:x         Lactate, Blood: 0.8 mmol/L (02-28-22 @ 15:30)      Coags:     12.30  ----< 1.07    ( 28 Feb 2022 23:30 )     34.7

## 2022-03-01 NOTE — ED ADULT NURSE REASSESSMENT NOTE - NS ED NURSE REASSESS COMMENT FT1
Assumed pt from previous nurse Segun and roomed in to 14 A c/o foreign body ingestion, reports accidentally swallowed dental bridge , AO x 4 ,no SOB  m no accessory muscles used m airway patent .

## 2022-03-01 NOTE — DISCHARGE NOTE PROVIDER - CARE PROVIDER_API CALL
Kevin Lake (DO)  Medicine  [address] [place] [address], [address] [place] [address]  Phone: [phone]  Fax: [phone]  Follow Up Time:

## 2022-03-01 NOTE — DISCHARGE NOTE PROVIDER - HOSPITAL COURSE
50 yo female with no significant PMH presents to the ED c/o accidental ingestion of her dental bridge around 5pm yesterday. Pt endorsed nausea yesterday that has subsided. Denies fevers, chill, headache, recent illness/travel, cough, abdominal pain, chest pain, or SOB.    In the ED, pt hemodynamically stable. Labs wnl except for AST 42, ALT 80    CT abdomen - CT abdomen/pelvis with IV contrast - Radiopaque 3.1 cm foreign body in the cecum, consistent with dental implant. Hepatic steatosis.    Evaluated by GI and Surgical team- No acute surgical intervention. Possible colonoscopy in AM if pt does not pass object.     GI scoped the pt and denture was retrieved. pt is stable for dc.     52 yo female with no significant PMH presents to the ED c/o accidental ingestion of her dental bridge around 5pm yesterday. Pt endorsed nausea yesterday that has subsided. Denies fevers, chill, headache, recent illness/travel, cough, abdominal pain, chest pain, or SOB.    In the ED, pt hemodynamically stable. Labs wnl except for AST 42, ALT 80    CT abdomen - CT abdomen/pelvis with IV contrast - Radiopaque 3.1 cm foreign body in the cecum, consistent with dental implant. Hepatic steatosis.    Evaluated by GI and Surgical team- No acute surgical intervention. Possible colonoscopy in AM if pt does not pass object.     GI scoped the pt and denture was retrieved. pt is stable for dc.      I have performed a history and physical exam of this patient and discussed the management with the resident. I have reviewed the resident note and agree with the documented findings and plan of care.

## 2022-03-01 NOTE — PROGRESS NOTE ADULT - ATTENDING COMMENTS
Patient is a 51F with no PMH who presents after accidentally swallowing her dentures.    Patient seen and examined.  No acute events.  Patient denies any active symptoms    Abdomen - soft, non tender, non distended    Vitals labs and images reviewed    Plan  - no acute surgical intervention  - allow time for patient to pass the denture  - daily KUB  - f/u GI for possible colonoscopy and retrieval  - surgery to follow Patient is a 51F with no PMH who presents after accidentally swallowing her dentures.    Patient seen and examined.  No acute events.  Patient denies any active symptoms    Abdomen - soft, non tender, non distended    Vitals labs and images reviewed    Plan  - no acute surgical intervention  - allow time for patient to pass the denture  - daily KUB  - f/u GI for possible colonoscopy and retrieval  - Please recall as needed

## 2022-03-01 NOTE — DISCHARGE NOTE NURSING/CASE MANAGEMENT/SOCIAL WORK - PATIENT PORTAL LINK FT
You can access the FollowMyHealth Patient Portal offered by [facility] by registering at the following website: [email or web address]. By joining [facility]’s FollowMyHealth portal, you will also be able to view your health information using other applications (apps) compatible with our system.

## 2022-03-01 NOTE — ED ADULT NURSE REASSESSMENT NOTE - NS ED NURSE REASSESS COMMENT FT1
pt is awake, A&Ox4, even and unlabored breathing on RA. pt denies pain.  awaiting GI consult. NAD noted, family at bedside.

## 2022-03-01 NOTE — DISCHARGE NOTE NURSING/CASE MANAGEMENT/SOCIAL WORK - NSDCPEFALRISK_GEN_ALL_CORE
For information on Fall & Injury Prevention, visit: [email or web address] OR  [email or web address] OR  https://www.cdc.gov/steadi/patient.html

## 2022-03-04 DIAGNOSIS — T18.4XXA FOREIGN BODY IN COLON, INITIAL ENCOUNTER: ICD-10-CM
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
## 2022-03-04 DIAGNOSIS — K76.0 FATTY (CHANGE OF) LIVER, NOT ELSEWHERE CLASSIFIED: ICD-10-CM
